# Patient Record
Sex: MALE | Race: OTHER | URBAN - METROPOLITAN AREA
[De-identification: names, ages, dates, MRNs, and addresses within clinical notes are randomized per-mention and may not be internally consistent; named-entity substitution may affect disease eponyms.]

---

## 2024-01-26 ENCOUNTER — TELEPHONE (OUTPATIENT)
Dept: FAMILY MEDICINE CLINIC | Facility: CLINIC | Age: 59
End: 2024-01-26

## 2024-01-26 NOTE — TELEPHONE ENCOUNTER
LVM requesting call back to schedule NEW PATIENT appointment with new provider if still interested. Currently scheduled with ALINE 02/08/2024   Detail Level: Simple Plan: Biopsy proven area. Will plan for an excision. Pt aware of bx results and plan. He will make this appt